# Patient Record
Sex: MALE | Race: WHITE | URBAN - METROPOLITAN AREA
[De-identification: names, ages, dates, MRNs, and addresses within clinical notes are randomized per-mention and may not be internally consistent; named-entity substitution may affect disease eponyms.]

---

## 2017-08-29 ENCOUNTER — RADIANT APPOINTMENT (OUTPATIENT)
Dept: GENERAL RADIOLOGY | Facility: CLINIC | Age: 27
End: 2017-08-29
Attending: PHYSICIAN ASSISTANT
Payer: OTHER MISCELLANEOUS

## 2017-08-29 ENCOUNTER — OFFICE VISIT (OUTPATIENT)
Dept: FAMILY MEDICINE | Facility: CLINIC | Age: 27
End: 2017-08-29
Payer: OTHER MISCELLANEOUS

## 2017-08-29 VITALS
SYSTOLIC BLOOD PRESSURE: 129 MMHG | HEIGHT: 72 IN | DIASTOLIC BLOOD PRESSURE: 80 MMHG | HEART RATE: 80 BPM | TEMPERATURE: 97.7 F | BODY MASS INDEX: 25.23 KG/M2 | WEIGHT: 186.3 LBS | OXYGEN SATURATION: 98 %

## 2017-08-29 DIAGNOSIS — S89.91XA INJURY OF RIGHT PATELLA, INITIAL ENCOUNTER: ICD-10-CM

## 2017-08-29 DIAGNOSIS — S89.91XA INJURY OF RIGHT PATELLA, INITIAL ENCOUNTER: Primary | ICD-10-CM

## 2017-08-29 PROCEDURE — 73562 X-RAY EXAM OF KNEE 3: CPT | Mod: RT

## 2017-08-29 PROCEDURE — 99214 OFFICE O/P EST MOD 30 MIN: CPT | Performed by: PHYSICIAN ASSISTANT

## 2017-08-29 RX ORDER — NAPROXEN 500 MG/1
500 TABLET ORAL 2 TIMES DAILY WITH MEALS
Qty: 30 TABLET | Refills: 1 | Status: SHIPPED | OUTPATIENT
Start: 2017-08-29 | End: 2017-09-15

## 2017-08-29 ASSESSMENT — PAIN SCALES - GENERAL: PAINLEVEL: SEVERE PAIN (6)

## 2017-08-29 NOTE — PROGRESS NOTES
SUBJECTIVE:   Miguel Angel Ohara is a 27 year old male who presents to clinic today for the following health issues:        WORK COMP  Right knee injruy      Duration: 8/27/2017    Description (location/character/radiation): Right knee    Intensity:  moderate    Accompanying signs and symptoms: swelling, pain    History (similar episodes/previous evaluation): None    Precipitating or alleviating factors: Not walking on it, movement    Therapies tried and outcome: advil           Problem list and histories reviewed & adjusted, as indicated.  Additional history: Patient was kneeling on concrete for a protracted time and twisted while working on a tire.  Although he did notice pain right away he proceeded to go to the [facility] and walk extensively.  Awoke with worsening sx.  Knee pads are available at work and he will avail himself in the future.       ROS:  Constitutional, HEENT, cardiovascular, pulmonary, gi and gu systems are negative, except as otherwise noted.      OBJECTIVE:   /80 (BP Location: Left arm, Patient Position: Sitting, Cuff Size: Adult Regular)  Pulse 80  Temp 97.7  F (36.5  C) (Oral)  Ht 1.829 m (6')  Wt 84.5 kg (186 lb 4.8 oz)  SpO2 98%  BMI 25.27 kg/m2  Body mass index is 25.27 kg/(m^2).  GENERAL: healthy, alert and no distress  MS: LE exam shows normal strength and muscle mass, no deformities, ROM of all joints is normal, no evidence of joint instability and Notable effusion of R knee with erythema.  Tenderness with patellar apprehension.  L knee is WNL.    SKIN: no suspicious lesions or rashes    Diagnostic Test Results:  Xray - Negative.     ASSESSMENT/PLAN:   1. Injury of right patella, initial encounter likely patellar bursitis.     - XR Knee Right 3 Views; Future  - naproxen (NAPROSYN) 500 MG tablet; Take 1 tablet (500 mg) by mouth 2 times daily (with meals)  Dispense: 30 tablet; Refill: 1    Use medication as directed.  RICE:  Rest: Do not use affected limb or joint.  Ice: 20 minutes,  3 x daily.  Do not use heat.  Compression: Ace wrap or brace as instructed.  Elevation:  Keep joint elevated when not in use.  Follow up if symptoms should persist, change or worsen.  Patient amenable to this follow up plan.   WR with restrictions drafted.    Shayne Agosto PA-C  [facility]

## 2017-08-29 NOTE — PATIENT INSTRUCTIONS
Based on your medical history and these are the current health maintenance or preventive care services that you are due for (some may have been done at this visit)  There are no preventive care reminders to display for this patient.      At [facility], we strive to deliver an exceptional experience to you, every time we see you.    If you receive a survey in the mail, please send us back your thoughts. We really do value your feedback.    Your care team's suggested websites for health information:  Www.Athena.org : Up to date and easily searchable information on multiple topics.  Www.medlineplus.gov : medication info, interactive tutorials, watch real surgeries online  Www.familydoctor.org : good info from the Academy of Family Physicians  Www.cdc.gov : public health info, travel advisories, epidemics (H1N1)  Www.aap.org : children's health info, normal development, vaccinations  Www.health.[facility].[place].us : [place] dept of health, public health issues in [place], N1N1    How to contact your care team:   Team Debora/Spirit [phone]         Pharmacy [phone]    Dr. Pompa, Yesenia Ruiz PA-C, Dr. Schwab, Florencia MCGUIRE CNP, Olimpia Briscoe PA-C, Dr. Moore, and SHAYLA Shah CNP    Team RNs: Iris Rodriguez      Clinic hours  M-Th 7 am-7 pm   Fri 7 am-5 pm.   Urgent care M-F 11 am-9 pm,   Sat/Sun 9 am-5 pm.  Pharmacy M-Th 8 am-8 pm Fri 8 am-6 pm  Sat/Sun 9 am-5 pm.     All password changes, disabled accounts, or ID changes in [employer]/MyHealth will be done by our Access Services Department.    If you need help with your account or password, call: [phone]. Clinic staff no longer has the ability to change passwords.

## 2017-08-29 NOTE — NURSING NOTE
Chief Complaint   Patient presents with     Work Comp     Right knee       Initial /80 (BP Location: Left arm, Patient Position: Sitting, Cuff Size: Adult Regular)  Pulse 80  Temp 97.7  F (36.5  C) (Oral)  Ht 6' (1.829 m)  Wt 186 lb 4.8 oz (84.5 kg)  SpO2 98%  BMI 25.27 kg/m2 Estimated body mass index is 25.27 kg/(m^2) as calculated from the following:    Height as of this encounter: 6' (1.829 m).    Weight as of this encounter: 186 lb 4.8 oz (84.5 kg).  Medication Reconciliation: complete   Marita HICKSA

## 2017-08-29 NOTE — LETTER
[address] [address]  Phone: [phone]    August 29, 2017        Miguel Angel Ohara  [address] [facility] [address]          To whom it may concern:    RE: Miguel Angel Ohara    Patient was seen and treated today at our clinic and missed work.  Patient may return to work 8/31/2017 with the following:  No kneeling for 14 days.   When the patient returns to work, the following restrictions apply until 9/14/2017:      Please contact me for questions or concerns.      Sincerely,        Shanye Agosto PA-C

## 2017-08-29 NOTE — MR AVS SNAPSHOT
After Visit Summary   8/29/2017    Miguel Angel Ohara    MRN: [id number]           Patient Information     Date Of Birth          1990        Visit Information        Provider Department      8/29/2017 9:00 AM Shayne Agosto PA-C [facility]        Today's Diagnoses     Injury of right patella, initial encounter    -  1      Care Instructions    Based on your medical history and these are the current health maintenance or preventive care services that you are due for (some may have been done at this visit)  There are no preventive care reminders to display for this patient.      At [facility], we strive to deliver an exceptional experience to you, every time we see you.    If you receive a survey in the mail, please send us back your thoughts. We really do value your feedback.    Your care team's suggested websites for health information:  Www.[employer] : Up to date and easily searchable information on multiple topics.  Www.medlineplus.gov : medication info, interactive tutorials, watch real surgeries online  Www.familydoctor.org : good info from the Academy of Family Physicians  Www.cdc.gov : public health info, travel advisories, epidemics (H1N1)  Www.aap.org : children's health info, normal development, vaccinations  Www.health.[facility].[place].us : [place] dept of health, public health issues in [place], N1N1    How to contact your care team:   Liz Cazares/Daquan [phone]         Pharmacy [phone]    Dr. Pompa, Yesenia Ruiz PA-C, Dr. Schwab, Florencia MCGUIRE CNP, Olimpia Briscoe PA-C, Dr. Moore, and SHAYLA Shah CNP    Team RNs: Iris & Jennifer      Clinic hours  M-Th 7 am-7 pm   Fri 7 am-5 pm.   Urgent care M-F 11 am-9 pm,   Sat/Sun 9 am-5 pm.  Pharmacy M-Th 8 am-8 pm Fri 8 am-6 pm  Sat/Sun 9 am-5 pm.     All password changes, disabled accounts, or ID changes in [employer]/MyHealth will be done by our Access Services Department.    If  "you need help with your account or password, call: [phone]. Clinic staff no longer has the ability to change passwords.               Follow-ups after your visit        Who to contact     If you have questions or need follow up information about today's clinic visit or your schedule please contact [facility] directly at [phone].  Normal or non-critical lab and imaging results will be communicated to you by 4INFOhart, letter or phone within 4 business days after the clinic has received the results. If you do not hear from us within 7 days, please contact the clinic through [employer]INFOhart or phone. If you have a critical or abnormal lab result, we will notify you by phone as soon as possible.  Submit refill requests through [employer] or call your pharmacy and they will forward the refill request to us. Please allow 3 business days for your refill to be completed.          Additional Information About Your Visit        4INFOharLogicLadder Information     [employer] lets you send messages to your doctor, view your test results, renew your prescriptions, schedule appointments and more. To sign up, go to [email or web address] . Click on \"Log in\" on the left side of the screen, which will take you to the Welcome page. Then click on \"Sign up Now\" on the right side of the page.     You will be asked to enter the access code listed below, as well as some personal information. Please follow the directions to create your username and password.     Your access code is: [email or web address]  Expires: 2017 11:08 AM     Your access code will  in 90 days. If you need help or a new code, please call your [facility] or [phone].        Care EveryWhere ID     This is your Care EveryWhere ID. This could be used by other organizations to access your [place] medical records  [id number]        Your Vitals Were     Pulse Temperature Height Pulse Oximetry BMI (Body Mass Index)       80 97.7  F (36.5  C) (Oral) 1.829 m " (6') 98% 25.27 kg/m2        Blood Pressure from Last 3 Encounters:   08/29/17 129/80   01/06/13 130/78   10/21/11 130/76    Weight from Last 3 Encounters:   08/29/17 84.5 kg (186 lb 4.8 oz)   01/06/13 78 kg (172 lb)   10/21/11 78.5 kg (173 lb)                 Today's Medication Changes          These changes are accurate as of: 8/29/17 11:08 AM.  If you have any questions, ask your nurse or doctor.               Start taking these medicines.        Dose/Directions    naproxen 500 MG tablet   Commonly known as:  NAPROSYN   Used for:  Injury of right patella, initial encounter   Started by:  Shayne Agosto PA-C        Dose:  500 mg   Take 1 tablet (500 mg) by mouth 2 times daily (with meals)   Quantity:  30 tablet   Refills:  1            Where to get your medicines      These medications were sent to [facility] [id number] [place] [address] - [address] AT [facility] [address], [facility] [address]     Phone:  [phone]     naproxen 500 MG tablet                Primary Care Provider Office Phone # Fax #    Norris Arian Whitlock [phone]       [address] [address]        Equal Access to Services     [facility]: Hadii aad ku hadasho Soomaali, waaxda luqadaha, qaybta kaalmada adeegyada, dejon muse hayyeimy fonseca . So [facility] [phone].    ATENCIÓN: Si habla español, tiene a fontaine disposición servicios gratuitos de asistencia lingüística. Llame al [phone].    We comply with applicable federal civil rights laws and [place] laws. We do not discriminate on the basis of race, color, national origin, age, disability sex, sexual orientation or gender identity.            Thank you!     Thank you for choosing [facility]  for your care. Our goal is always to provide you with excellent care. Hearing back from our patients is one way we can continue to improve our services. Please take  a few minutes to complete the written survey that you may receive in the mail after your visit with us. Thank you!             Your Updated Medication List - Protect others around you: Learn how to safely use, store and throw away your medicines at www.disposemymeds.org.          This list is accurate as of: 8/29/17 11:08 AM.  Always use your most recent med list.                   Brand Name Dispense Instructions for use Diagnosis    amoxicillin 875 MG tablet    AMOXIL    20 tablet    Take 1 tablet by mouth 2 times daily.    Right acute otitis media       naproxen 500 MG tablet    NAPROSYN    30 tablet    Take 1 tablet (500 mg) by mouth 2 times daily (with meals)    Injury of right patella, initial encounter       NO ACTIVE MEDICATIONS      .        TYLENOL PO      Take  by mouth.

## 2017-09-01 ENCOUNTER — TELEPHONE (OUTPATIENT)
Dept: FAMILY MEDICINE | Facility: CLINIC | Age: 27
End: 2017-09-01

## 2017-09-01 NOTE — TELEPHONE ENCOUNTER
..Reason for Call:  Updating you/seen on 08-29-17    Detailed comments: knee is still swollen, not diminishing, and is experiencing some pain; Is there something else he can take in conjunction with the aleve  to relieve the pain?    Phone Number Patient can be reached at: Home number on file [phone] (home)    Best Time: anytime    Can we leave a detailed message on this number? YES    Call taken on 9/1/2017 at 10:42 AM by Ivett Campbell

## 2017-09-01 NOTE — TELEPHONE ENCOUNTER
If patient is wearing knee sleeve and icing 20 minutes 3-4  Daily he can add tylenol 500-1000 mg on top of the Aleve every 4 hours.  Leandro PATEL

## 2017-09-01 NOTE — TELEPHONE ENCOUNTER
Called and left voicemail with the detailed information below.    Iris Morales RN, [facility]

## 2017-09-15 ENCOUNTER — OFFICE VISIT (OUTPATIENT)
Dept: FAMILY MEDICINE | Facility: CLINIC | Age: 27
End: 2017-09-15
Payer: OTHER MISCELLANEOUS

## 2017-09-15 VITALS
WEIGHT: 181.9 LBS | BODY MASS INDEX: 24.67 KG/M2 | HEART RATE: 94 BPM | TEMPERATURE: 98.4 F | SYSTOLIC BLOOD PRESSURE: 142 MMHG | DIASTOLIC BLOOD PRESSURE: 89 MMHG | OXYGEN SATURATION: 99 %

## 2017-09-15 DIAGNOSIS — L02.415 ABSCESS OF KNEE, RIGHT: ICD-10-CM

## 2017-09-15 DIAGNOSIS — S89.91XA INJURY OF RIGHT PATELLA, INITIAL ENCOUNTER: ICD-10-CM

## 2017-09-15 DIAGNOSIS — S80.01XA TRAUMATIC HEMATOMA OF RIGHT KNEE, INITIAL ENCOUNTER: Primary | ICD-10-CM

## 2017-09-15 PROCEDURE — 10140 I&D HMTMA SEROMA/FLUID COLLJ: CPT | Performed by: PHYSICIAN ASSISTANT

## 2017-09-15 PROCEDURE — 87070 CULTURE OTHR SPECIMN AEROBIC: CPT | Performed by: PHYSICIAN ASSISTANT

## 2017-09-15 PROCEDURE — 87186 SC STD MICRODIL/AGAR DIL: CPT | Performed by: PHYSICIAN ASSISTANT

## 2017-09-15 PROCEDURE — 87205 SMEAR GRAM STAIN: CPT | Performed by: PHYSICIAN ASSISTANT

## 2017-09-15 PROCEDURE — 99207 ZZC DROP WITH A PROCEDURE: CPT | Performed by: PHYSICIAN ASSISTANT

## 2017-09-15 PROCEDURE — 87077 CULTURE AEROBIC IDENTIFY: CPT | Performed by: PHYSICIAN ASSISTANT

## 2017-09-15 RX ORDER — NAPROXEN 500 MG/1
500 TABLET ORAL 2 TIMES DAILY WITH MEALS
Qty: 30 TABLET | Refills: 1 | Status: SHIPPED | OUTPATIENT
Start: 2017-09-15

## 2017-09-15 ASSESSMENT — PAIN SCALES - GENERAL: PAINLEVEL: SEVERE PAIN (6)

## 2017-09-15 NOTE — NURSING NOTE
Chief Complaint   Patient presents with     Work Comp     Right knee       Initial /89 (BP Location: Left arm, Patient Position: Sitting, Cuff Size: Adult Regular)  Pulse 94  Temp 98.4  F (36.9  C) (Oral)  Wt 181 lb 14.4 oz (82.5 kg)  SpO2 99%  BMI 24.67 kg/m2 Estimated body mass index is 24.67 kg/(m^2) as calculated from the following:    Height as of 8/29/17: 6' (1.829 m).    Weight as of this encounter: 181 lb 14.4 oz (82.5 kg).  Medication Reconciliation: complete   Marita HICKSA

## 2017-09-15 NOTE — MR AVS SNAPSHOT
After Visit Summary   9/15/2017    Miguel Angel Ohara    MRN: [id number]           Patient Information     Date Of Birth          1990        Visit Information        Provider Department      9/15/2017 1:40 PM Shayne Agosto PA-C [facility]        Today's Diagnoses     Traumatic hematoma of right knee, initial encounter    -  1    Abscess of knee, right        Injury of right patella, initial encounter          Care Instructions    Based on your medical history and these are the current health maintenance or preventive care services that you are due for (some may have been done at this visit)  Health Maintenance Due   Topic Date Due     INFLUENZA VACCINE (SYSTEM ASSIGNED)  09/01/2017         At [facility], we strive to deliver an exceptional experience to you, every time we see you.    If you receive a survey in the mail, please send us back your thoughts. We really do value your feedback.    Your care team's suggested websites for health information:  Www.[employer].org : Up to date and easily searchable information on multiple topics.  Www.medlineplus.gov : medication info, interactive tutorials, watch real surgeries online  Www.familydoctor.org : good info from the Academy of Family Physicians  Www.cdc.gov : public health info, travel advisories, epidemics (H1N1)  Www.aap.org : children's health info, normal development, vaccinations  Www.health.[facility].[place].us : [place] dept of health, public health issues in [place], N1N1    How to contact your care team:   Team Debora/Spirit [phone]         Pharmacy [phone]    Dr. Pompa, Yesenia Ruiz PA-C, Florencia Crespo APRN CNP, Olimpia Briscoe PA-C, Dr. Moore, and SHAYLA Shah CNP    Team RNs: Iris & Jennifer      Clinic hours  M-Th 7 am-7 pm   Fri 7 am-5 pm.   Urgent care M-F 11 am-9 pm,   Sat/Sun 9 am-5 pm.  Pharmacy M-Th 8 am-8 pm Fri 8 am-6 pm  Sat/Sun 9 am-5 pm.     All password  "changes, disabled accounts, or ID changes in [employer]/MyHealth will be done by our Access Services Department.    If you need help with your account or password, call: [phone]. Clinic staff no longer has the ability to change passwords.             Follow-ups after your visit        Who to contact     If you have questions or need follow up information about today's clinic visit or your schedule please contact [facility] directly at [phone].  Normal or non-critical lab and imaging results will be communicated to you by MyChart, letter or phone within 4 business days after the clinic has received the results. If you do not hear from us within 7 days, please contact the clinic through [email or web address] or phone. If you have a critical or abnormal lab result, we will notify you by phone as soon as possible.  Submit refill requests through [employer] or call your pharmacy and they will forward the refill request to us. Please allow 3 business days for your refill to be completed.          Additional Information About Your Visit        [employer] Information     [employer] lets you send messages to your doctor, view your test results, renew your prescriptions, schedule appointments and more. To sign up, go to [email or web address] . Click on \"Log in\" on the left side of the screen, which will take you to the Welcome page. Then click on \"Sign up Now\" on the right side of the page.     You will be asked to enter the access code listed below, as well as some personal information. Please follow the directions to create your username and password.     Your access code is: [email or web address]  Expires: 2017 11:08 AM     Your access code will  in 90 days. If you need help or a new code, please call your [facility] or [phone].        Care EveryWhere ID     This is your Care EveryWhere ID. This could be used by other organizations to access your [place] medical records  [id number]        Your " Vitals Were     Pulse Temperature Pulse Oximetry BMI (Body Mass Index)          94 98.4  F (36.9  C) (Oral) 99% 24.67 kg/m2         Blood Pressure from Last 3 Encounters:   09/15/17 142/89   08/29/17 129/80   01/06/13 130/78    Weight from Last 3 Encounters:   09/15/17 181 lb 14.4 oz (82.5 kg)   08/29/17 186 lb 4.8 oz (84.5 kg)   01/06/13 172 lb (78 kg)              We Performed the Following     Fluid Culture Aerobic Bacterial     Gram stain          Today's Medication Changes          These changes are accurate as of: 9/15/17  3:08 PM.  If you have any questions, ask your nurse or doctor.               Start taking these medicines.        Dose/Directions    amoxicillin-clavulanate 875-125 MG per tablet   Commonly known as:  AUGMENTIN   Used for:  Abscess of knee, right   Started by:  Shayne Agosto PA-C        Dose:  1 tablet   Take 1 tablet by mouth 2 times daily for 7 days   Quantity:  14 tablet   Refills:  0            Where to get your medicines      These medications were sent to [id number] - [place], [place] - [address] AT [facility] & [address], [facility] [address]     Phone:  [phone]     amoxicillin-clavulanate 875-125 MG per tablet    naproxen 500 MG tablet                Primary Care Provider Office Phone # Fax #    Norris Arian Whitlock [phone]       [address] [address]        Equal Access to Services     [facility] AH: Hadii aad ku hadasho Soomaali, waaxda luqadaha, qaybta kaalmada adeegyada, dejon aaron. So [facility] [phone].    ATENCIÓN: Si habla español, tiene a fontaine disposición servicios gratuitos de asistencia lingüística. Llame al [phone].    We comply with applicable federal civil rights laws and [place] laws. We do not discriminate on the basis of race, color, national origin, age, disability sex, sexual orientation or gender identity.             Thank you!     Thank you for choosing [facility]  for your care. Our goal is always to provide you with excellent care. Hearing back from our patients is one way we can continue to improve our services. Please take a few minutes to complete the written survey that you may receive in the mail after your visit with us. Thank you!             Your Updated Medication List - Protect others around you: Learn how to safely use, store and throw away your medicines at www.disposemymeds.org.          This list is accurate as of: 9/15/17  3:08 PM.  Always use your most recent med list.                   Brand Name Dispense Instructions for use Diagnosis    amoxicillin 875 MG tablet    AMOXIL    20 tablet    Take 1 tablet by mouth 2 times daily.    Right acute otitis media       amoxicillin-clavulanate 875-125 MG per tablet    AUGMENTIN    14 tablet    Take 1 tablet by mouth 2 times daily for 7 days    Abscess of knee, right       naproxen 500 MG tablet    NAPROSYN    30 tablet    Take 1 tablet (500 mg) by mouth 2 times daily (with meals)    Injury of right patella, initial encounter       NO ACTIVE MEDICATIONS      .        TYLENOL PO      Take  by mouth.

## 2017-09-15 NOTE — PATIENT INSTRUCTIONS
Based on your medical history and these are the current health maintenance or preventive care services that you are due for (some may have been done at this visit)  Health Maintenance Due   Topic Date Due     INFLUENZA VACCINE (SYSTEM ASSIGNED)  09/01/2017         At [facility], we strive to deliver an exceptional experience to you, every time we see you.    If you receive a survey in the mail, please send us back your thoughts. We really do value your feedback.    Your care team's suggested websites for health information:  Www.[employer].org : Up to date and easily searchable information on multiple topics.  Www.medlineplus.gov : medication info, interactive tutorials, watch real surgeries online  Www.familydoctor.org : good info from the Academy of Family Physicians  Www.cdc.gov : public health info, travel advisories, epidemics (H1N1)  Www.aap.org : children's health info, normal development, vaccinations  Www.health.[facility].mn.us : [place] dept of health, public health issues in [place], N1N1    How to contact your care team:   Team Debora/Spirit [phone]         Pharmacy [phone]    Dr. Pompa, Yesenia Ruiz PA-C, Dr. Schwab, Florencia MCGUIRE CNP, Olimpia Briscoe PA-C, Dr. Moore, and SHAYLA Shah CNP    Team RNs: Iris Rodriguez      Clinic hours  M-Th 7 am-7 pm   Fri 7 am-5 pm.   Urgent care M-F 11 am-9 pm,   Sat/Sun 9 am-5 pm.  Pharmacy M-Th 8 am-8 pm Fri 8 am-6 pm  Sat/Sun 9 am-5 pm.     All password changes, disabled accounts, or ID changes in [employer]/MyHealth will be done by our Access Services Department.    If you need help with your account or password, call: [phone]. Clinic staff no longer has the ability to change passwords.

## 2017-09-15 NOTE — PROGRESS NOTES
SUBJECTIVE:   Miguel Angel Ohara is a 27 year old male who presents to clinic today for the following health issues:        Work Comp  Knee injury right      Duration: 2 days    Description (location/character/radiation): right knee    Intensity:  moderate    Accompanying signs and symptoms: Red, swelling, pus,     History (similar episodes/previous evaluation): injured knee 2 weeks ago     Precipitating or alleviating factors: None    Therapies tried and outcome: Advil,Naprosyn           Problem list and histories reviewed & adjusted, as indicated.  Additional history: Patient admits to itching his knee lately.       Reviewed and updated as needed this visit by clinical staffTobacco  Allergies       ROS:  Constitutional, HEENT, cardiovascular, pulmonary, gi and gu systems are negative, except as otherwise noted.      OBJECTIVE:   /89 (BP Location: Left arm, Patient Position: Sitting, Cuff Size: Adult Regular)  Pulse 94  Temp 98.4  F (36.9  C) (Oral)  Wt 181 lb 14.4 oz (82.5 kg)  SpO2 99%  BMI 24.67 kg/m2  Body mass index is 24.67 kg/(m^2).    Procedural Note: R knee is markedly swollen and erythematous with slough and crusting in a 3 cm area over patella.  Site prepped with ETOH and 2 mL Lido 2% without epi administered and site prepped with Betadine and infected skin with slough removed with #10 and tissue forceps with mechanical brush.  Site prepped again and re-anesthetized with 3 mL Lido 1% with epi to improve effect and #18 g used to evacuate 18 mL of thick bloody effluent with purulence and some clotting.  This was sent for CX.  Site dressed with compression and site care reviewed.  Patient tolerated procedure well with no sequelae.     Diagnostic Test Results:  none     ASSESSMENT/PLAN:   1. Traumatic hematoma of right knee, initial encounter  - Fluid Culture Aerobic Bacterial  - Gram stain    2. Abscess of knee, right  - amoxicillin-clavulanate (AUGMENTIN) 875-125 MG per tablet; Take 1 tablet by  mouth 2 times daily for 7 days  Dispense: 14 tablet; Refill: 0  - Fluid Culture Aerobic Bacterial  - Gram stain    3. Injury of right patella, initial encounter  - naproxen (NAPROSYN) 500 MG tablet; Take 1 tablet (500 mg) by mouth 2 times daily (with meals)  Dispense: 30 tablet; Refill: 1    Site care as reviewed.  Keep compression in place as directed  Follow up in 4-5 days for recheck,  Sooner if needed.  Follow up on Cx  Use medication as directed.  Patient amenable to this follow up plan.     Shayne Agosto PA-C  [facility]

## 2017-09-16 ENCOUNTER — TELEPHONE (OUTPATIENT)
Dept: FAMILY MEDICINE | Facility: CLINIC | Age: 27
End: 2017-09-16

## 2017-09-16 LAB
GRAM STN SPEC: ABNORMAL
GRAM STN SPEC: ABNORMAL
SPECIMEN SOURCE: ABNORMAL

## 2017-09-16 NOTE — TELEPHONE ENCOUNTER
On call provider called by lab with wound culture results: positive for gm + cocci in clusters.     Reviewed chart. Patient on augmentin for patella injury/abcess (does not appear from preliminary note that fluid taken was from joint, but superficial)  Called patient. His sx's are stable. No fever, chills, malaise, worsening redness/swelling of knee. He is tolerating and taking the augmentin.  rec continued abx. Pcp will f/u with him with final culture results.   rec urgent eval this weekend if sx's worsen or systemic sx's arise.

## 2017-09-17 LAB
BACTERIA SPEC CULT: ABNORMAL
SPECIMEN SOURCE: ABNORMAL

## 2017-09-19 ENCOUNTER — OFFICE VISIT (OUTPATIENT)
Dept: FAMILY MEDICINE | Facility: CLINIC | Age: 27
End: 2017-09-19
Payer: OTHER MISCELLANEOUS

## 2017-09-19 VITALS
HEART RATE: 83 BPM | DIASTOLIC BLOOD PRESSURE: 79 MMHG | BODY MASS INDEX: 25.17 KG/M2 | SYSTOLIC BLOOD PRESSURE: 136 MMHG | WEIGHT: 185.6 LBS | OXYGEN SATURATION: 99 % | TEMPERATURE: 97.7 F

## 2017-09-19 DIAGNOSIS — L02.419 CELLULITIS AND ABSCESS OF LEG, EXCEPT FOOT: Primary | ICD-10-CM

## 2017-09-19 DIAGNOSIS — L03.119 CELLULITIS AND ABSCESS OF LEG, EXCEPT FOOT: Primary | ICD-10-CM

## 2017-09-19 PROCEDURE — 99024 POSTOP FOLLOW-UP VISIT: CPT | Performed by: PHYSICIAN ASSISTANT

## 2017-09-19 NOTE — PATIENT INSTRUCTIONS
Based on your medical history and these are the current health maintenance or preventive care services that you are due for (some may have been done at this visit)  Health Maintenance Due   Topic Date Due     INFLUENZA VACCINE (SYSTEM ASSIGNED)  09/01/2017         At [facility], we strive to deliver an exceptional experience to you, every time we see you.    If you receive a survey in the mail, please send us back your thoughts. We really do value your feedback.    Your care team's suggested websites for health information:  Www.[employer].org : Up to date and easily searchable information on multiple topics.  Www.medlineplus.gov : medication info, interactive tutorials, watch real surgeries online  Www.familydoctor.org : good info from the Academy of Family Physicians  Www.cdc.gov : public health info, travel advisories, epidemics (H1N1)  Www.aap.org : children's health info, normal development, vaccinations  Www.health.[facility].us : [place] dept of health, public health issues in [place], N1N1    How to contact your care team:   Team Debora/Spirit [phone]         Pharmacy [phone]    Dr. Pompa, Yesenia Ruiz PA-C, Dr. Schwab, Florencia MCGUIRE CNP, Olimpia Briscoe PA-C, Dr. Moore, and SHAYLA Shah CNP    Team RNs: Iris Rodriguez      Clinic hours  M-Th 7 am-7 pm   Fri 7 am-5 pm.   Urgent care M-F 11 am-9 pm,   Sat/Sun 9 am-5 pm.  Pharmacy M-Th 8 am-8 pm Fri 8 am-6 pm  Sat/Sun 9 am-5 pm.     All password changes, disabled accounts, or ID changes in [employer]/MyHealth will be done by our Access Services Department.    If you need help with your account or password, call: [phone]. Clinic staff no longer has the ability to change passwords.

## 2017-09-19 NOTE — PROGRESS NOTES
SUBJECTIVE:   Miguel Angel Ohara is a 27 year old male who presents to clinic today for the following health issues:         Musculoskeletal problem/pain  Right knee Injury  Follow up      Duration: 6 days     Description  Location: right knee    Intensity:  mild    Accompanying signs and symptoms: redness and Bleeding     History  Previous similar problem: YES ongoing knee injury  since 8/30/2017  Previous evaluation:  x-ray    Precipitating or alleviating factors:  Trauma or overuse: YES  Aggravating factors include: Limited ROM    Therapies tried and outcome: ABX and Naproxen is helping          Problem list and histories reviewed & adjusted, as indicated.  Additional history: Patient has noticed markedly less pain and can now bend his knee readily.  Pt tolerating Augmentin but does have some GI irritation.      ROS:  Constitutional, HEENT, cardiovascular, pulmonary, gi and gu systems are negative, except as otherwise noted.      OBJECTIVE:   /79 (BP Location: Left arm, Patient Position: Sitting, Cuff Size: Adult Regular)  Pulse 83  Temp 97.7  F (36.5  C) (Oral)  Wt 185 lb 9.6 oz (84.2 kg)  SpO2 99%  BMI 25.17 kg/m2  Body mass index is 25.17 kg/(m^2).  GENERAL: healthy, alert and no distress  MS: No effusion of R knee noted. Non tender to palpation.   SKIN: Markedly decreased fluctuance with healing sites of debridement.  No suppuration evident.  Sl erythema noted but less.  Unable to discern at this point if prolonged course of Augmentin required.  Patient amenable to follow up.     Diagnostic Test Results:  none     ASSESSMENT/PLAN:   1. Cellulitis and abscess of leg, except foot        Continue Augmentin  Follow up in 3 days.  Add Probiotics as reviewed.   Patient amenable to this follow up plan.     Shayne Agosto PA-C  [facility]

## 2017-09-19 NOTE — MR AVS SNAPSHOT
After Visit Summary   9/19/2017    Miguel Angel Ohara    MRN: [id number]           Patient Information     Date Of Birth          1990        Visit Information        Provider Department      9/19/2017 8:40 AM Shayne Agosto PA-C [facility]        Today's Diagnoses     Cellulitis and abscess of leg, except foot    -  1      Care Instructions    Based on your medical history and these are the current health maintenance or preventive care services that you are due for (some may have been done at this visit)  Health Maintenance Due   Topic Date Due     INFLUENZA VACCINE (SYSTEM ASSIGNED)  09/01/2017         At [facility], we strive to deliver an exceptional experience to you, every time we see you.    If you receive a survey in the mail, please send us back your thoughts. We really do value your feedback.    Your care team's suggested websites for health information:  Www.[employer] : Up to date and easily searchable information on multiple topics.  Www.medlineplus.gov : medication info, interactive tutorials, watch real surgeries online  Www.familydoctor.org : good info from the Academy of Family Physicians  Www.cdc.gov : public health info, travel advisories, epidemics (H1N1)  Www.aap.org : children's health info, normal development, vaccinations  Www.health.[facility].[place].us : [place] dept of health, public health issues in [place], N1N1    How to contact your care team:   Team Debora/Daquan [phone]         Pharmacy [phone]    Dr. Pompa, Yesenia Ruiz PA-C, Dr. Schwab, Florencia Renee APRN CNP, Olimpia Briscoe PA-C, Dr. Moore, and SHAYLA Shah CNP    Team RNs: Iris & Jennifer      Clinic hours  M-Th 7 am-7 pm   Fri 7 am-5 pm.   Urgent care M-F 11 am-9 pm,   Sat/Sun 9 am-5 pm.  Pharmacy M-Th 8 am-8 pm Fri 8 am-6 pm  Sat/Sun 9 am-5 pm.     All password changes, disabled accounts, or ID changes in MyChart/MyHealth will be done by our Access  "Services Department.    If you need help with your account or password, call: [phone]. Clinic staff no longer has the ability to change passwords.             Follow-ups after your visit        Who to contact     If you have questions or need follow up information about today's clinic visit or your schedule please contact [facility] directly at [phone].  Normal or non-critical lab and imaging results will be communicated to you by Forsitechart, letter or phone within 4 business days after the clinic has received the results. If you do not hear from us within 7 days, please contact the clinic through Forsitechart or phone. If you have a critical or abnormal lab result, we will notify you by phone as soon as possible.  Submit refill requests through Printed Piece or call your pharmacy and they will forward the refill request to us. Please allow 3 business days for your refill to be completed.          Additional Information About Your Visit        ForsitecharF.8 Interactive Information     Printed Piece lets you send messages to your doctor, view your test results, renew your prescriptions, schedule appointments and more. To sign up, go to [email or web address] . Click on \"Log in\" on the left side of the screen, which will take you to the Welcome page. Then click on \"Sign up Now\" on the right side of the page.     You will be asked to enter the access code listed below, as well as some personal information. Please follow the directions to create your username and password.     Your access code is: [email or web address]  Expires: 2017 11:08 AM     Your access code will  in 90 days. If you need help or a new code, please call your [facility] clinic or [phone].        Care EveryWhere ID     This is your Care EveryWhere ID. This could be used by other organizations to access your [facility] medical records  [id number]        Your Vitals Were     Pulse Temperature Pulse Oximetry BMI (Body Mass Index)          83 97.7  F " (36.5  C) (Oral) 99% 25.17 kg/m2         Blood Pressure from Last 3 Encounters:   09/19/17 136/79   09/15/17 142/89   08/29/17 129/80    Weight from Last 3 Encounters:   09/19/17 185 lb 9.6 oz (84.2 kg)   09/15/17 181 lb 14.4 oz (82.5 kg)   08/29/17 186 lb 4.8 oz (84.5 kg)              Today, you had the following     No orders found for display       Primary Care Provider Office Phone # Fax #    Norris Whitlock,  [phone]       [address] [address]        Equal Access to Services     JL GARCIA : Hadii fritz ku hadasho Sogloriaali, waaxda luqadaha, qaybta kaalmada adeegyada, waxay randyin hayyeimy fonseca . So [facility] [phone].    ATENCIÓN: Si habla español, tiene a fontaine disposición servicios gratuitos de asistencia lingüística. Llame al [phone].    We comply with applicable federal civil rights laws and [place] laws. We do not discriminate on the basis of race, color, national origin, age, disability sex, sexual orientation or gender identity.            Thank you!     Thank you for choosing [facility]  for your care. Our goal is always to provide you with excellent care. Hearing back from our patients is one way we can continue to improve our services. Please take a few minutes to complete the written survey that you may receive in the mail after your visit with us. Thank you!             Your Updated Medication List - Protect others around you: Learn how to safely use, store and throw away your medicines at www.disposemymeds.org.          This list is accurate as of: 9/19/17  9:35 AM.  Always use your most recent med list.                   Brand Name Dispense Instructions for use Diagnosis    amoxicillin-clavulanate 875-125 MG per tablet    AUGMENTIN    14 tablet    Take 1 tablet by mouth 2 times daily for 7 days    Abscess of knee, right       naproxen 500 MG tablet    NAPROSYN    30 tablet    Take 1 tablet (500 mg) by  mouth 2 times daily (with meals)    Injury of right patella, initial encounter       NO ACTIVE MEDICATIONS      .        TYLENOL PO      Take  by mouth.

## 2017-09-19 NOTE — NURSING NOTE
Chief Complaint   Patient presents with     Work Comp     right knee       Initial /79 (BP Location: Left arm, Patient Position: Sitting, Cuff Size: Adult Regular)  Pulse 83  Temp 97.7  F (36.5  C) (Oral)  Wt 185 lb 9.6 oz (84.2 kg)  SpO2 99%  BMI 25.17 kg/m2 Estimated body mass index is 25.17 kg/(m^2) as calculated from the following:    Height as of 8/29/17: 6' (1.829 m).    Weight as of this encounter: 185 lb 9.6 oz (84.2 kg).  Medication Reconciliation: complete Marita HICKSA

## 2017-09-22 ENCOUNTER — OFFICE VISIT (OUTPATIENT)
Dept: FAMILY MEDICINE | Facility: CLINIC | Age: 27
End: 2017-09-22
Payer: OTHER MISCELLANEOUS

## 2017-09-22 VITALS
WEIGHT: 187.2 LBS | SYSTOLIC BLOOD PRESSURE: 127 MMHG | DIASTOLIC BLOOD PRESSURE: 73 MMHG | TEMPERATURE: 98.4 F | BODY MASS INDEX: 25.39 KG/M2 | OXYGEN SATURATION: 99 % | HEART RATE: 80 BPM

## 2017-09-22 DIAGNOSIS — S89.91XS KNEE INJURY, RIGHT, SEQUELA: Primary | ICD-10-CM

## 2017-09-22 PROCEDURE — 99213 OFFICE O/P EST LOW 20 MIN: CPT | Performed by: PHYSICIAN ASSISTANT

## 2017-09-22 NOTE — Clinical Note
Call Patient next week to ensure that knee skin has formed a clot.  If the granular tissue is still present with no clotting Patient needs to return for debridement. Thank you.  Leandro PATEL

## 2017-09-22 NOTE — NURSING NOTE
Chief Complaint   Patient presents with     Work Comp       Initial /73 (BP Location: Left arm, Patient Position: Sitting, Cuff Size: Adult Regular)  Pulse 80  Temp 98.4  F (36.9  C) (Oral)  Wt 187 lb 3.2 oz (84.9 kg)  SpO2 99%  BMI 25.39 kg/m2 Estimated body mass index is 25.39 kg/(m^2) as calculated from the following:    Height as of 8/29/17: 6' (1.829 m).    Weight as of this encounter: 187 lb 3.2 oz (84.9 kg).  Medication Reconciliation: complete   Marita JAY

## 2017-09-22 NOTE — PROGRESS NOTES
SUBJECTIVE:   Miguel Angel Ohara is a 27 year old male who presents to clinic today for the following health issues:        Musculoskeletal problem/ knee injury  Follow up      Duration: on going    Description  Location: right knee    Intensity:  mild    Accompanying signs and symptoms: redness    History  Previous similar problem: YES  Previous evaluation:  x-ray    Precipitating or alleviating factors:  Trauma or overuse: no   Aggravating factors include: none    Therapies tried and outcome: Naprosyn          Problem list and histories reviewed & adjusted, as indicated.  Additional history: Patient feels better.  Re-Reviewed site care.     Reviewed and updated as needed this visit by clinical staffTobacco       ROS:  Constitutional, HEENT, cardiovascular, pulmonary, gi and gu systems are negative, except as otherwise noted.      OBJECTIVE:   /73 (BP Location: Left arm, Patient Position: Sitting, Cuff Size: Adult Regular)  Pulse 80  Temp 98.4  F (36.9  C) (Oral)  Wt 187 lb 3.2 oz (84.9 kg)  SpO2 99%  BMI 25.39 kg/m2  Body mass index is 25.39 kg/(m^2).  GENERAL: healthy, alert and no distress  MS: Improved ROM R knee with markedly decreased effusion and non-tender   SKIN: Site of initial fingernail trauma by Patient still present and open with no erythma or suppuration noted.     Diagnostic Test Results:  none     ASSESSMENT/PLAN:   1. Knee injury, right, sequela      Leave site to the air for clot formation.  Will have nurse follow up next week by phone.   Follow up if symptoms should persist, change or worsen.  Patient amenable to this follow up plan.     Shayne Agosto PA-C  [facility]

## 2017-09-22 NOTE — MR AVS SNAPSHOT
After Visit Summary   9/22/2017    Miguel Angel Ohara    MRN: [id number]           Patient Information     Date Of Birth          1990        Visit Information        Provider Department      9/22/2017 3:00 PM Shayne Agosto PA-C [facility]        Today's Diagnoses     Knee injury, right, sequela    -  1      Care Instructions    Based on your medical history and these are the current health maintenance or preventive care services that you are due for (some may have been done at this visit)  Health Maintenance Due   Topic Date Due     INFLUENZA VACCINE (SYSTEM ASSIGNED)  09/01/2017         At [facility], we strive to deliver an exceptional experience to you, every time we see you.    If you receive a survey in the mail, please send us back your thoughts. We really do value your feedback.    Your care team's suggested websites for health information:  Www.[employer] : Up to date and easily searchable information on multiple topics.  Www.medlineplus.gov : medication info, interactive tutorials, watch real surgeries online  Www.familydoctor.org : good info from the Academy of Family Physicians  Www.cdc.gov : public health info, travel advisories, epidemics (H1N1)  Www.aap.org : children's health info, normal development, vaccinations  Www.health.[facility].[place].us : [place] dept of health, public health issues in [place], N1N1    How to contact your care team:   Liz Cazares/Daquan [phone]         Pharmacy [phone]    Dr. Pompa, Yesenia Ruiz PA-C, Dr. Schwab, Florencia Renee APRN CNP, Olimpia Briscoe PA-C, Dr. Moore, and SHAYLA Shah CNP    Team RN: Iris      Clinic hours  M-Th 7 am-7 pm   Fri 7 am-5 pm.   Urgent care M-F 11 am-9 pm,   Sat/Sun 9 am-5 pm.  Pharmacy M-Th 8 am-8 pm Fri 8 am-6 pm  Sat/Sun 9 am-5 pm.     All password changes, disabled accounts, or ID changes in [employer]/MyHealth will be done by our Access Services  "Department.    If you need help with your account or password, call: [phone]. Clinic staff no longer has the ability to change passwords.             Follow-ups after your visit        Who to contact     If you have questions or need follow up information about today's clinic visit or your schedule please contact [facility] directly at [phone].  Normal or non-critical lab and imaging results will be communicated to you by Pantechhart, letter or phone within 4 business days after the clinic has received the results. If you do not hear from us within 7 days, please contact the clinic through Pantechhart or phone. If you have a critical or abnormal lab result, we will notify you by phone as soon as possible.  Submit refill requests through mSeller or call your pharmacy and they will forward the refill request to us. Please allow 3 business days for your refill to be completed.          Additional Information About Your Visit        mSeller Information     mSeller lets you send messages to your doctor, view your test results, renew your prescriptions, schedule appointments and more. To sign up, go to [email or web address] . Click on \"Log in\" on the left side of the screen, which will take you to the Welcome page. Then click on \"Sign up Now\" on the right side of the page.     You will be asked to enter the access code listed below, as well as some personal information. Please follow the directions to create your username and password.     Your access code is: [email or web address]  Expires: 2017 11:08 AM     Your access code will  in 90 days. If you need help or a new code, please call your [facility] or [phone].        Care EveryWhere ID     This is your Care EveryWhere ID. This could be used by other organizations to access your [facility] medical records  [id number]        Your Vitals Were     Pulse Temperature Pulse Oximetry BMI (Body Mass Index)          80 98.4  F (36.9  C) " (Oral) 99% 25.39 kg/m2         Blood Pressure from Last 3 Encounters:   09/22/17 127/73   09/19/17 136/79   09/15/17 142/89    Weight from Last 3 Encounters:   09/22/17 187 lb 3.2 oz (84.9 kg)   09/19/17 185 lb 9.6 oz (84.2 kg)   09/15/17 181 lb 14.4 oz (82.5 kg)              Today, you had the following     No orders found for display       Primary Care Provider Office Phone # Fax #    Norris Whitlock, [phone]       [address] [address]        Equal Access to Services     JL GARCIA : Hadii fritz andersono Solex, waaxda luqadaha, qaybta kaalmada adeegyada, dejon aaron. So [facility] [phone].    ATENCIÓN: Si habla español, tiene a fontaine disposición servicios gratuitos de asistencia lingüística. Llame al [phone].    We comply with applicable federal civil rights laws and [place] laws. We do not discriminate on the basis of race, color, national origin, age, disability sex, sexual orientation or gender identity.            Thank you!     Thank you for choosing [facility]  for your care. Our goal is always to provide you with excellent care. Hearing back from our patients is one way we can continue to improve our services. Please take a few minutes to complete the written survey that you may receive in the mail after your visit with us. Thank you!             Your Updated Medication List - Protect others around you: Learn how to safely use, store and throw away your medicines at www.disposemymeds.org.          This list is accurate as of: 9/22/17  3:08 PM.  Always use your most recent med list.                   Brand Name Dispense Instructions for use Diagnosis    amoxicillin-clavulanate 875-125 MG per tablet    AUGMENTIN    14 tablet    Take 1 tablet by mouth 2 times daily for 7 days    Abscess of knee, right       naproxen 500 MG tablet    NAPROSYN    30 tablet    Take 1 tablet (500 mg) by mouth 2  times daily (with meals)    Injury of right patella, initial encounter       NO ACTIVE MEDICATIONS      .        TYLENOL PO      Take  by mouth.

## 2017-09-22 NOTE — PATIENT INSTRUCTIONS
Based on your medical history and these are the current health maintenance or preventive care services that you are due for (some may have been done at this visit)  Health Maintenance Due   Topic Date Due     INFLUENZA VACCINE (SYSTEM ASSIGNED)  09/01/2017         At [facility], we strive to deliver an exceptional experience to you, every time we see you.    If you receive a survey in the mail, please send us back your thoughts. We really do value your feedback.    Your care team's suggested websites for health information:  Www.[employer].org : Up to date and easily searchable information on multiple topics.  Www.medlineplus.gov : medication info, interactive tutorials, watch real surgeries online  Www.familydoctor.org : good info from the Academy of Family Physicians  Www.cdc.gov : public health info, travel advisories, epidemics (H1N1)  Www.aap.org : children's health info, normal development, vaccinations  Www.health.[facility].[place].us : [place] dept of health, public health issues in [place], N1N1    How to contact your care team:   Team Debora/Spirit [phone]         Pharmacy [phone]    Dr. Pompa, Yesenia Ruiz PA-C, Dr. Schwab, Florencia MCGUIRE CNP, Olimpia Briscoe PA-C, Dr. Moore, and SHAYLA Shah CNP    Team RN: Iris      Clinic hours  M-Th 7 am-7 pm   Fri 7 am-5 pm.   Urgent care M-F 11 am-9 pm,   Sat/Sun 9 am-5 pm.  Pharmacy M-Th 8 am-8 pm Fri 8 am-6 pm  Sat/Sun 9 am-5 pm.     All password changes, disabled accounts, or ID changes in FookyZ/MyHealth will be done by our Access Services Department.    If you need help with your account or password, call: [phone]. Clinic staff no longer has the ability to change passwords.

## 2017-09-25 ENCOUNTER — TELEPHONE (OUTPATIENT)
Dept: FAMILY MEDICINE | Facility: CLINIC | Age: 27
End: 2017-09-25

## 2017-09-25 NOTE — PROGRESS NOTES
Call Patient next week to ensure that knee skin has formed a clot.  If the granular tissue is still present with no clotting Patient needs to return for debridement.   Thank you.   Leandro PATEL       See telephone encounter dated 9/25/17.  Hiren Thomson,  For Teams Comfort and Heart

## 2017-09-25 NOTE — TELEPHONE ENCOUNTER
Per huddle with provider, postponing this to be called on Tuesday or Wednesday this week when provider in clinic. If patient needs to come in for debridement, can schedule OV with provider.      Arsenio Jeronimo RN

## 2017-09-25 NOTE — TELEPHONE ENCOUNTER
"Per Shayne's note from pt's last office visit last week.  Hiren Thomson,  For Teams Comfort and Heart    RN's pls call and route back to Shayne Agosto PA-C    \"Call Patient next week to ensure that knee skin has formed a clot.  If the granular tissue is still present with no clotting Patient needs to return for debridement.   Thank you.   Leandro PATEL\"  "

## 2017-09-26 NOTE — TELEPHONE ENCOUNTER
Patient contacted. Swelling gone, mild redness, he has been using neosporin and bandaid on it. Still not completely scabbed over, but he feels it looks like it is healing.     Routing to provider to review and advise.   Marisela Milligan RN

## 2017-09-27 ENCOUNTER — TELEPHONE (OUTPATIENT)
Dept: FAMILY MEDICINE | Facility: CLINIC | Age: 27
End: 2017-09-27

## 2017-09-27 ENCOUNTER — OFFICE VISIT (OUTPATIENT)
Dept: FAMILY MEDICINE | Facility: CLINIC | Age: 27
End: 2017-09-27
Payer: OTHER MISCELLANEOUS

## 2017-09-27 VITALS
WEIGHT: 183.8 LBS | BODY MASS INDEX: 24.93 KG/M2 | TEMPERATURE: 98.7 F | OXYGEN SATURATION: 96 % | HEART RATE: 80 BPM | SYSTOLIC BLOOD PRESSURE: 130 MMHG | DIASTOLIC BLOOD PRESSURE: 75 MMHG

## 2017-09-27 DIAGNOSIS — L08.9 INFECTION, WOUND STATUS POST TRAUMA: Primary | ICD-10-CM

## 2017-09-27 DIAGNOSIS — T14.8XXA INFECTION, WOUND STATUS POST TRAUMA: Primary | ICD-10-CM

## 2017-09-27 PROCEDURE — 99213 OFFICE O/P EST LOW 20 MIN: CPT | Performed by: PHYSICIAN ASSISTANT

## 2017-09-27 NOTE — PROGRESS NOTES
SUBJECTIVE:   Miguel Angel Ohara is a 27 year old male who presents to clinic today for the following health issues:        Follow up Work Comp  Knee      Problem list and histories reviewed & adjusted, as indicated.  Additional history: Patient has been keeping it bandaged despite recommendations.  Noticed purulence returning.       Reviewed and updated as needed this visit by clinical staff  Tobacco  Allergies       Reviewed and updated as needed this visit by Provider         ROS:  Constitutional, HEENT, cardiovascular, pulmonary, gi and gu systems are negative, except as otherwise noted.      OBJECTIVE:   /75 (BP Location: Right arm, Patient Position: Sitting, Cuff Size: Adult Regular)  Pulse 80  Temp 98.7  F (37.1  C) (Oral)  Wt 183 lb 12.8 oz (83.4 kg)  SpO2 96%  BMI 24.93 kg/m2  Body mass index is 24.93 kg/(m^2).    Procedure Note:  R Knee shows 2 open wound sites with granular tissue formation rolled borders.  Site was anesthetized with 2 mL Lido 1% with epi and Mechanically debrided with Betadine surgical scrub and #15 blade scalpel. Tissue and rolled borders removed and sloughed tissue debrided.   Site care reviewed with patient who tolerated the procedure well with no sequelae.       ASSESSMENT/PLAN:   1. Infection, wound status post trauma      Keep site clean and dry.  Dab with ETOH 2-3 x daily  Follow up with any pus, redness, pain or other signs of infection as reviewed.  Patient amenable to this follow up plan.     Shayne Agosto PA-C  [facility]

## 2017-09-27 NOTE — TELEPHONE ENCOUNTER
Reason for Call:  Other RT KNEE     Detailed comments: Patient thinks that there was miss-commication when he talked to the nurse and then to KIN Agosto. Patient left his wound uncovered and now has scabbed over and do not think that he needs to have done what KIN Agosto had said she needed to do to the wound, something like scraping the wound.    Phone Number Patient can be reached at: Home number on file [phone] (home)    Best Time: any    Can we leave a detailed message on this number? YES    Call taken on 9/27/2017 at 10:26 AM by Emma Metz

## 2017-09-27 NOTE — PATIENT INSTRUCTIONS
Based on your medical history and these are the current health maintenance or preventive care services that you are due for (some may have been done at this visit)  Health Maintenance Due   Topic Date Due     INFLUENZA VACCINE (SYSTEM ASSIGNED)  09/01/2017         At [facility], we strive to deliver an exceptional experience to you, every time we see you.    If you receive a survey in the mail, please send us back your thoughts. We really do value your feedback.    Your care team's suggested websites for health information:  Www.[employer].org : Up to date and easily searchable information on multiple topics.  Www.medlineplus.gov : medication info, interactive tutorials, watch real surgeries online  Www.familydoctor.org : good info from the Academy of Family Physicians  Www.cdc.gov : public health info, travel advisories, epidemics (H1N1)  Www.aap.org : children's health info, normal development, vaccinations  Www.health.[facility].[place].us : [place] dept of health, public health issues in [place], N1N1    How to contact your care team:   Team Debora/Spirit [phone]         Pharmacy [phone]    Dr. Pompa, Yesenia Ruiz PA-C, Dr. Schwab, Florencia MCGUIRE CNP, Olimpia Briscoe PA-C, Dr. Moore, and SHAYLA Shah CNP    Team RN: Iris      Clinic hours  M-Th 7 am-7 pm   Fri 7 am-5 pm.   Urgent care M-F 11 am-9 pm,   Sat/Sun 9 am-5 pm.  Pharmacy M-Th 8 am-8 pm Fri 8 am-6 pm  Sat/Sun 9 am-5 pm.     All password changes, disabled accounts, or ID changes in [employer]/MyHealth will be done by our Access Services Department.    If you need help with your account or password, call: [phone]. Clinic staff no longer has the ability to change passwords.

## 2017-09-27 NOTE — MR AVS SNAPSHOT
After Visit Summary   9/27/2017    Miguel Angel Ohara    MRN: [id number]           Patient Information     Date Of Birth          1990        Visit Information        Provider Department      9/27/2017 4:20 PM Shayne Agosto PA-C [facility]        Today's Diagnoses     Infection, wound status post trauma    -  1      Care Instructions    Based on your medical history and these are the current health maintenance or preventive care services that you are due for (some may have been done at this visit)  Health Maintenance Due   Topic Date Due     INFLUENZA VACCINE (SYSTEM ASSIGNED)  09/01/2017         At [facility], we strive to deliver an exceptional experience to you, every time we see you.    If you receive a survey in the mail, please send us back your thoughts. We really do value your feedback.    Your care team's suggested websites for health information:  Www.YouTab.[employer] : Up to date and easily searchable information on multiple topics.  Www.medlineplus.gov : medication info, interactive tutorials, watch real surgeries online  Www.familydoctor.org : good info from the Academy of Family Physicians  Www.cdc.gov : public health info, travel advisories, epidemics (H1N1)  Www.aap.org : children's health info, normal development, vaccinations  Www.health.[facility].[place].us : [place] dept of health, public health issues in [place], N1N1    How to contact your care team:   Team Debora/Daquan [phone]         Pharmacy [phone]    Dr. Pompa, Yesenia Ruiz PA-C, Dr. Schwab, Florencia Renee APRN CNP, Olimpia Briscoe PA-C, Dr. Moore, and SHAYLA Shah CNP    Team RN: Iris      Clinic hours  M-Th 7 am-7 pm   Fri 7 am-5 pm.   Urgent care M-F 11 am-9 pm,   Sat/Sun 9 am-5 pm.  Pharmacy M-Th 8 am-8 pm Fri 8 am-6 pm  Sat/Sun 9 am-5 pm.     All password changes, disabled accounts, or ID changes in [place]/MyHealth will be done by our Access Services  "Department.    If you need help with your account or password, call: [phone]. Clinic staff no longer has the ability to change passwords.             Follow-ups after your visit        Who to contact     If you have questions or need follow up information about today's clinic visit or your schedule please contact [facility] directly at [phone].  Normal or non-critical lab and imaging results will be communicated to you by [employer], letter or phone within 4 business days after the clinic has received the results. If you do not hear from us within 7 days, please contact the clinic through [employer] or phone. If you have a critical or abnormal lab result, we will notify you by phone as soon as possible.  Submit refill requests through My[employer]MD or call your pharmacy and they will forward the refill request to us. Please allow 3 business days for your refill to be completed.          Additional Information About Your Visit        MynewMD Information     MynewMD lets you send messages to your doctor, view your test results, renew your prescriptions, schedule appointments and more. To sign up, go to [email or web address] . Click on \"Log in\" on the left side of the screen, which will take you to the Welcome page. Then click on \"Sign up Now\" on the right side of the page.     You will be asked to enter the access code listed below, as well as some personal information. Please follow the directions to create your username and password.     Your access code is: [email or web address]  Expires: 2017 11:08 AM     Your access code will  in 90 days. If you need help or a new code, please call your [facility] or [phone].        Care EveryWhere ID     This is your Care EveryWhere ID. This could be used by other organizations to access your [place] medical records  [id number]        Your Vitals Were     Pulse Temperature Pulse Oximetry BMI (Body Mass Index)          80 98.7  F (37.1  C) " (Oral) 96% 24.93 kg/m2         Blood Pressure from Last 3 Encounters:   09/27/17 130/75   09/22/17 127/73   09/19/17 136/79    Weight from Last 3 Encounters:   09/27/17 183 lb 12.8 oz (83.4 kg)   09/22/17 187 lb 3.2 oz (84.9 kg)   09/19/17 185 lb 9.6 oz (84.2 kg)              Today, you had the following     No orders found for display       Primary Care Provider Office Phone # Fax #    Norris Whitlock, [phone]       [address] [address]        Equal Access to Services     JL GARCIA : Hadii fritz negron Solex, waaxda luqadaha, qaybta kaalmada adeegyada, dejon aaron. So [facility] [phone].    ATENCIÓN: Si habla español, tiene a fontaine disposición servicios gratuitos de asistencia lingüística. Llame al [phone].    We comply with applicable federal civil rights laws and [place] laws. We do not discriminate on the basis of race, color, national origin, age, disability sex, sexual orientation or gender identity.            Thank you!     Thank you for choosing [facility]  for your care. Our goal is always to provide you with excellent care. Hearing back from our patients is one way we can continue to improve our services. Please take a few minutes to complete the written survey that you may receive in the mail after your visit with us. Thank you!             Your Updated Medication List - Protect others around you: Learn how to safely use, store and throw away your medicines at www.disposemymeds.org.          This list is accurate as of: 9/27/17  4:55 PM.  Always use your most recent med list.                   Brand Name Dispense Instructions for use Diagnosis    naproxen 500 MG tablet    NAPROSYN    30 tablet    Take 1 tablet (500 mg) by mouth 2 times daily (with meals)    Injury of right patella, initial encounter       NO ACTIVE MEDICATIONS      .        TYLENOL PO      Take  by mouth.

## 2017-09-27 NOTE — NURSING NOTE
Chief Complaint   Patient presents with     Work Comp       Initial /75 (BP Location: Right arm, Patient Position: Sitting, Cuff Size: Adult Regular)  Pulse 80  Temp 98.7  F (37.1  C) (Oral)  Wt 183 lb 12.8 oz (83.4 kg)  SpO2 96%  BMI 24.93 kg/m2 Estimated body mass index is 24.93 kg/(m^2) as calculated from the following:    Height as of 8/29/17: 6' (1.829 m).    Weight as of this encounter: 183 lb 12.8 oz (83.4 kg).  Medication Reconciliation: complete   Marita JAY

## 2017-09-27 NOTE — TELEPHONE ENCOUNTER
Called the patient and he is wondering since the wound is scabbing if he needs to be seen. Huddle with provider and the answer is no. Called patient back and he thinks he will now keep the appointment.    Iris Morales RN, [facility]

## 2018-06-30 ENCOUNTER — OFFICE VISIT (OUTPATIENT)
Dept: URGENT CARE | Facility: URGENT CARE | Age: 28
End: 2018-06-30
Payer: COMMERCIAL

## 2018-06-30 VITALS
DIASTOLIC BLOOD PRESSURE: 80 MMHG | SYSTOLIC BLOOD PRESSURE: 134 MMHG | TEMPERATURE: 98.2 F | WEIGHT: 193 LBS | RESPIRATION RATE: 17 BRPM | HEART RATE: 66 BPM | OXYGEN SATURATION: 97 % | BODY MASS INDEX: 26.18 KG/M2

## 2018-06-30 DIAGNOSIS — S61.213A LACERATION OF LEFT MIDDLE FINGER WITHOUT FOREIGN BODY WITHOUT DAMAGE TO NAIL, INITIAL ENCOUNTER: Primary | ICD-10-CM

## 2018-06-30 DIAGNOSIS — Z23 NEED FOR PROPHYLACTIC VACCINATION WITH COMBINED DIPHTHERIA-TETANUS-PERTUSSIS (DTP) VACCINE: ICD-10-CM
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
## 2018-06-30 PROCEDURE — 90715 TDAP VACCINE 7 YRS/> IM: CPT | Performed by: PHYSICIAN ASSISTANT

## 2018-06-30 PROCEDURE — 90471 IMMUNIZATION ADMIN: CPT | Performed by: PHYSICIAN ASSISTANT

## 2018-06-30 PROCEDURE — 99213 OFFICE O/P EST LOW 20 MIN: CPT | Mod: 25 | Performed by: PHYSICIAN ASSISTANT

## 2018-06-30 NOTE — NURSING NOTE
Screening Questionnaire for Adult Immunization    Are you sick today?   No   Do you have allergies to medications, food, a vaccine component or latex?   No   Have you ever had a serious reaction after receiving a vaccination?   No   Do you have a long-term health problem with heart disease, lung disease, asthma, kidney disease, metabolic disease (e.g. diabetes), anemia, or other blood disorder?   No   Do you have cancer, leukemia, HIV/AIDS, or any other immune system problem?   No   In the past 3 months, have you taken medications that affect  your immune system, such as prednisone, other steroids, or anticancer drugs; drugs for the treatment of rheumatoid arthritis, Crohn s disease, or psoriasis; or have you had radiation treatments?   No   Have you had a seizure, or a brain or other nervous system problem?   No   During the past year, have you received a transfusion of blood or blood     products, or been given immune (gamma) globulin or antiviral drug?   No   For women: Are you pregnant or is there a chance you could become        pregnant during the next month?   No   Have you received any vaccinations in the past 4 weeks?   No     Immunization questionnaire answers were all negative.        Per orders of Jacinto Daley PA-C, injection of Tdap given by Norma Batista. Patient instructed to remain in clinic for 15 minutes afterwards, and to report any adverse reaction to me immediately.       Screening performed by Norma Batista on 6/30/2018 at 3:44 PM.

## 2018-06-30 NOTE — MR AVS SNAPSHOT
"              After Visit Summary   6/30/2018    Miguel Angel Ohara    MRN: [id number]           Patient Information     Date Of Birth          1990        Visit Information        Provider Department      6/30/2018 3:00 PM Jacinto Daley PA-C [facility]        Today's Diagnoses     Laceration of left middle finger without foreign body without damage to nail, initial encounter    -  1    Dog bite, initial encounter        Need for prophylactic vaccination with combined diphtheria-tetanus-pertussis (DTP) vaccine           Follow-ups after your visit        Who to contact     If you have questions or need follow up information about today's clinic visit or your schedule please contact [facility] directly at [phone].  Normal or non-critical lab and imaging results will be communicated to you by [employer], letter or phone within 4 business days after the clinic has received the results. If you do not hear from us within 7 days, please contact the clinic through [employer] or phone. If you have a critical or abnormal lab result, we will notify you by phone as soon as possible.  Submit refill requests through [employer] or call your pharmacy and they will forward the refill request to us. Please allow 3 business days for your refill to be completed.          Additional Information About Your Visit        MyChart Information     [employer] lets you send messages to your doctor, view your test results, renew your prescriptions, schedule appointments and more. To sign up, go to [email or web address] . Click on \"Log in\" on the left side of the screen, which will take you to the Welcome page. Then click on \"Sign up Now\" on the right side of the page.     You will be asked to enter the access code listed below, as well as some personal information. Please follow the directions to create your username and password.     Your access code is: [email or web address]  Expires: 9/28/2018  3:45 PM   "   Your access code will  in 90 days. If you need help or a new code, please call your [place] clinic or [phone].        Care EveryWhere ID     This is your Care EveryWhere ID. This could be used by other organizations to access your [place] medical records  [id number]        Your Vitals Were     Pulse Temperature Respirations Pulse Oximetry BMI (Body Mass Index)       66 98.2  F (36.8  C) (Oral) 17 97% 26.18 kg/m2        Blood Pressure from Last 3 Encounters:   18 134/80   17 130/75   17 127/73    Weight from Last 3 Encounters:   18 193 lb (87.5 kg)   17 183 lb 12.8 oz (83.4 kg)   17 187 lb 3.2 oz (84.9 kg)              We Performed the Following     ADMIN 1st VACCINE     TDAP, IM (10 - 64 YRS) - Adacel          Today's Medication Changes          These changes are accurate as of 18  3:45 PM.  If you have any questions, ask your nurse or doctor.               Start taking these medicines.        Dose/Directions    amoxicillin-clavulanate 875-125 MG per tablet   Commonly known as:  AUGMENTIN   Used for:  Laceration of left middle finger without foreign body without damage to nail, initial encounter, Dog bite, initial encounter   Started by:  Jacinto Daley PA-C        Dose:  1 tablet   Take 1 tablet by mouth 2 times daily for 7 days   Quantity:  14 tablet   Refills:  0            Where to get your medicines      [facility] [place], [place] [address] AT [facility] [address], [facility] [address]     Phone:  [phone]     amoxicillin-clavulanate 875-125 MG per tablet                Primary Care Provider Office Phone # Fax #    Norris Arian Whitlock [phone]       [address] [address]        Equal Access to Services     [facility] JOSE AH: Hadii fritz andersono Solex, waaxda luqadaha, qaybta kaalmada adeegyada, waxay  micha watsonhaydee augustine'aan ah. So [facility] [phone].    ATENCIÓN: Si alfredola juana, tiene a fontaine disposición servicios gratuitos de asistencia lingüística. Monique al [phone].    We comply with applicable federal civil rights laws and [place] laws. We do not discriminate on the basis of race, color, national origin, age, disability, sex, sexual orientation, or gender identity.            Thank you!     Thank you for choosing [facility]  for your care. Our goal is always to provide you with excellent care. Hearing back from our patients is one way we can continue to improve our services. Please take a few minutes to complete the written survey that you may receive in the mail after your visit with us. Thank you!             Your Updated Medication List - Protect others around you: Learn how to safely use, store and throw away your medicines at www.disposemymeds.org.          This list is accurate as of 6/30/18  3:45 PM.  Always use your most recent med list.                   Brand Name Dispense Instructions for use Diagnosis    amoxicillin-clavulanate 875-125 MG per tablet    AUGMENTIN    14 tablet    Take 1 tablet by mouth 2 times daily for 7 days    Laceration of left middle finger without foreign body without damage to nail, initial encounter, Dog bite, initial encounter       naproxen 500 MG tablet    NAPROSYN    30 tablet    Take 1 tablet (500 mg) by mouth 2 times daily (with meals)    Injury of right patella, initial encounter       NO ACTIVE MEDICATIONS      .        TYLENOL PO      Take  by mouth.

## 2018-06-30 NOTE — PROGRESS NOTES
Chief Complaint:    Chief Complaint   Patient presents with     Dog Bite       HPI: Miguel Angel Ohara is a 28 year old male who sustained a left middle injury 1 hours ago. Mechanism of injury: Patient was bitten by his dog.  His dog is up to date on vaccinations. Immediate symptoms: immediate pain. Symptoms have been improving since that time. Prior history of related problems: no prior problems with this area in the past.  He denies any numbness or tingling.  He did wash the finger well with soap and water.    ROS:  Further problem focused system review was otherwise negative.       Family History   No family history on file.     Problem history  There is no problem list on file for this patient.       Allergies  No Known Allergies     Social History  Social History     Social History     Marital status: Single     Spouse name: N/A     Number of children: N/A     Years of education: N/A     Occupational History     Not on file.     Social History Main Topics     Smoking status: Never Smoker     Smokeless tobacco: Never Used     Alcohol use No     Drug use: No     Sexual activity: Not on file     Other Topics Concern     Not on file     Social History Narrative        Current Meds    Current Outpatient Prescriptions:      Acetaminophen (TYLENOL PO), Take  by mouth., Disp: , Rfl:      naproxen (NAPROSYN) 500 MG tablet, Take 1 tablet (500 mg) by mouth 2 times daily (with meals) (Patient not taking: Reported on 6/30/2018), Disp: 30 tablet, Rfl: 1     NO ACTIVE MEDICATIONS, ., Disp: , Rfl:      OBJECTIVE:     Vital signs were reviewed by me.  /80 (BP Location: Left arm, Patient Position: Chair, Cuff Size: Adult Large)  Pulse 66  Temp 98.2  F (36.8  C) (Oral)  Resp 17  Wt 193 lb (87.5 kg)  SpO2 97%  BMI 26.18 kg/m2  Appearance: in no apparent distress, well developed and well nourished, non-toxic and cooperative.  Hand and wrist exam: 2 cm laceration of the palmar side of the middle finger distal phalange.  No  foreign bodies.  Full range of motion.  Neurologically intact.  Cap refill less than 2 seconds.       ASSESSMENT:     (S61.213A) Laceration of left middle finger without foreign body without damage to nail, initial encounter  (primary encounter diagnosis)  Plan: amoxicillin-clavulanate (AUGMENTIN) 875-125 MG         per tablet    (W54.0XXA) Dog bite, initial encounter  Plan: amoxicillin-clavulanate (AUGMENTIN) 875-125 MG         per tablet    (Z23) Need for prophylactic vaccination with combined diphtheria-tetanus-pertussis (DTP) vaccine       PLAN:    Patient cleaned the wound well after bite occurred.  Due to animal bite, wound will not be closed.  He was given tetanus booster in clinic today.  Rx for Augmentin called in.  Antibiotic ointment and band aid applied.  Patient instructed to soak finger 3 times daily for next week.  Reviewed signs and symptoms of infection with instructions to return.  Patient verbalized understanding and agreed with this plan.     Jacinto Daley  6/30/2018, 3:20 PM